# Patient Record
Sex: FEMALE | Race: WHITE | NOT HISPANIC OR LATINO | URBAN - METROPOLITAN AREA
[De-identification: names, ages, dates, MRNs, and addresses within clinical notes are randomized per-mention and may not be internally consistent; named-entity substitution may affect disease eponyms.]

---

## 2023-06-15 ENCOUNTER — APPOINTMENT (RX ONLY)
Dept: URBAN - METROPOLITAN AREA CLINIC 98 | Facility: CLINIC | Age: 23
Setting detail: DERMATOLOGY
End: 2023-06-15

## 2023-06-15 DIAGNOSIS — D22 MELANOCYTIC NEVI: ICD-10-CM

## 2023-06-15 DIAGNOSIS — L21.8 OTHER SEBORRHEIC DERMATITIS: ICD-10-CM

## 2023-06-15 PROBLEM — D22.4 MELANOCYTIC NEVI OF SCALP AND NECK: Status: ACTIVE | Noted: 2023-06-15

## 2023-06-15 PROCEDURE — 99203 OFFICE O/P NEW LOW 30 MIN: CPT

## 2023-06-15 PROCEDURE — ? COUNSELING

## 2023-06-15 ASSESSMENT — LOCATION DETAILED DESCRIPTION DERM
LOCATION DETAILED: RIGHT CENTRAL FRONTAL SCALP
LOCATION DETAILED: MEDIAL FRONTAL SCALP

## 2023-06-15 ASSESSMENT — LOCATION SIMPLE DESCRIPTION DERM
LOCATION SIMPLE: RIGHT SCALP
LOCATION SIMPLE: FRONTAL SCALP

## 2023-06-15 ASSESSMENT — LOCATION ZONE DERM: LOCATION ZONE: SCALP

## 2023-12-14 ENCOUNTER — APPOINTMENT (RX ONLY)
Dept: URBAN - METROPOLITAN AREA CLINIC 98 | Facility: CLINIC | Age: 23
Setting detail: DERMATOLOGY
End: 2023-12-14

## 2023-12-14 DIAGNOSIS — Q819 OTHER SPECIFIED ANOMALIES OF SKIN: ICD-10-CM

## 2023-12-14 DIAGNOSIS — Q828 OTHER SPECIFIED ANOMALIES OF SKIN: ICD-10-CM

## 2023-12-14 DIAGNOSIS — L85.3 XEROSIS CUTIS: ICD-10-CM

## 2023-12-14 DIAGNOSIS — Q826 OTHER SPECIFIED ANOMALIES OF SKIN: ICD-10-CM

## 2023-12-14 PROBLEM — L85.8 OTHER SPECIFIED EPIDERMAL THICKENING: Status: ACTIVE | Noted: 2023-12-14

## 2023-12-14 PROCEDURE — ? COUNSELING

## 2023-12-14 PROCEDURE — ? IN-HOUSE DISPENSING PHARMACY

## 2023-12-14 PROCEDURE — 99213 OFFICE O/P EST LOW 20 MIN: CPT

## 2023-12-14 PROCEDURE — ? RECOMMENDATIONS

## 2023-12-14 ASSESSMENT — LOCATION SIMPLE DESCRIPTION DERM
LOCATION SIMPLE: LEFT POSTERIOR UPPER ARM
LOCATION SIMPLE: RIGHT POSTERIOR UPPER ARM

## 2023-12-14 ASSESSMENT — LOCATION DETAILED DESCRIPTION DERM
LOCATION DETAILED: RIGHT PROXIMAL POSTERIOR UPPER ARM
LOCATION DETAILED: LEFT PROXIMAL POSTERIOR UPPER ARM

## 2023-12-14 ASSESSMENT — LOCATION ZONE DERM: LOCATION ZONE: ARM

## 2023-12-14 NOTE — PROCEDURE: IN-HOUSE DISPENSING PHARMACY
Product 74 Refills: 0
Product 14 Amount/Unit (Numbers Only): 500
Product 21 Unit Type: tube(s)
Name Of Product 2: Tretinoin 0.05%
Name Of Product 5: Upneeq
Product 66 Unit Type: mg
Product 11 Amount/Unit (Numbers Only): 3
Product 8 Application Directions: Take 1 tablet before the procedure by mouth
Name Of Product 19: Triluma
Product 3 Amount/Unit (Numbers Only): 1
Name Of Product 22: Calcipotriene/ 5 FLuoracil cream
Product 8 Unit Type: tablets
Product 16 Application Directions: Apply to lashes at bedtime only
Product 13 Application Directions: Apply to wound site every 48 hours as indicated.
Name Of Product 25: Valium 5mg
Product 2 Application Directions: Apply a pea size amount to Entire face  at bedtime
Product 5 Application Directions: Apply one drop to each eye
Product 4 Price/Unit (In Dollars): 0.00
Product 13 Unit Type: unit(s)
Product 23 Amount/Unit (Numbers Only): 5
Product 5 Unit Type: kit(s)
Product 12 Refills: 2
Product 19 Application Directions: Apply thin layer at bedtime to melasma.
Name Of Product 9: Brightening pads 6%
Product 15 Application Directions: Apply to affected skin as indicated
Name Of Product 24: Tretinoin 0.05% cream
Product 12 Application Directions: Apply to the entire face in the Am and Pm
Product 18 Application Directions: Mix with cerave cream and apply to body daily after shower
Product 1 Application Directions: Take one tablet post injection
Product 4 Application Directions: Apply to Eyelids at bedtime
Name Of Product 8: Valium 5 mg
Product 12 Unit Type: bottle(s)
Product 1 Unit Type: grams
Name Of Product 31: Prednisone 20 mg
Product 15 Unit Type: ml
Product 21 Application Directions: Apply pea size amount to entire face at bedtime only
Name Of Product 11: Valtrex 1 gram
Name Of Product 13: Duoderm thin
Name Of Product 16: Latisse
Product 24 Application Directions: Apply pea size amount to entire face at bedtime only.
Name Of Product 26: Tylenol 500mg
Product 13 Price/Unit (In Dollars): $10.00
Render Product Pricing In Note: No
Product 3 Unit Type: jar(s)
Product 20 Application Directions: Apply thin layer at bedtime to entire face
Name Of Product 12: Skin Better Even tone Serum
Product 23 Application Directions: Take by mouth.
Name Of Product 15: Hardik
Name Of Product 4: Latisse 5 ml
Name Of Product 18: Tretinoin cream 0.05%
Name Of Product 1: Prednisone 10mg
Product 7 Application Directions: Apply one drop in each eye
Product 10 Application Directions: Apply to face once daily
Product 26 Application Directions: Take one pill by mouth
Product 22 Application Directions: Apply small amount to temple and forehead twice daily as indicated x 3 or 5 days
Name Of Product 6: Prednisone 10 mg
Name Of Product 3: Hydroquinone brightening pads 6%
Product 15 Amount/Unit (Numbers Only): 30
Product 24 Price/Unit (In Dollars): 20
Detail Level: Detailed
Product 9 Application Directions: Apply to affected area on the face area am as indicated.
Product 1 Amount/Unit (Numbers Only): 10
Name Of Product 23: Diazepam (Valium)
Product 11 Application Directions: Take one tablet prior to procedure and one tablet daily x 3 days
Product 17 Application Directions: Take one tablet 20 mg today in office and 10 mg tomorrow if needed for swelling. Dispense in office.
Product 6 Application Directions: Take 1 pill by mouth today,and another tomorrow
Product 3 Application Directions: Apply to face daily as indicated
Product 14 Application Directions: Take one tablet prior to procedure. Continue twice daily x 3 days as indicated.

## 2023-12-14 NOTE — PROCEDURE: RECOMMENDATIONS
Recommendations (Free Text): Mix tretinoin with CeraVe apply to arms daily at bedtime
Render Risk Assessment In Note?: no
Detail Level: Detailed